# Patient Record
Sex: MALE | URBAN - METROPOLITAN AREA
[De-identification: names, ages, dates, MRNs, and addresses within clinical notes are randomized per-mention and may not be internally consistent; named-entity substitution may affect disease eponyms.]

---

## 2021-11-01 ENCOUNTER — OFFICE VISIT (OUTPATIENT)
Dept: URBAN - METROPOLITAN AREA CLINIC 3 | Facility: CLINIC | Age: 81
End: 2021-11-01
Payer: COMMERCIAL

## 2021-11-01 DIAGNOSIS — H52.13 MYOPIA, BILATERAL: Primary | ICD-10-CM

## 2021-11-01 DIAGNOSIS — H02.834 DERMATOCHALASIS OF LEFT UPPER EYELID: ICD-10-CM

## 2021-11-01 DIAGNOSIS — H02.831 DERMATOCHALASIS OF RIGHT UPPER EYELID: ICD-10-CM

## 2021-11-01 PROCEDURE — 92012 INTRM OPH EXAM EST PATIENT: CPT | Performed by: OPHTHALMOLOGY

## 2021-11-01 RX ORDER — ROSUVASTATIN CALCIUM 10 MG/1
10 MG TABLET, FILM COATED ORAL AS DIRECTED
Refills: 0 | Status: ACTIVE
Start: 2021-11-01

## 2021-11-01 RX ORDER — ASPIRIN 81 MG/1
81 MG TABLET, COATED ORAL
Qty: 0 | Refills: 0 | Status: ACTIVE
Start: 2021-11-01

## 2021-11-01 RX ORDER — FLUTICASONE PROPIONATE 50 UG/1
SPRAY, METERED NASAL
Refills: 0 | Status: ACTIVE
Start: 2021-11-01

## 2021-11-01 RX ORDER — LOSARTAN POTASSIUM 25 MG/1
25 MG TABLET ORAL
Refills: 0 | Status: ACTIVE
Start: 2021-11-01

## 2021-11-01 RX ORDER — GLIPIZIDE 5 MG/1
5 MG TABLET ORAL
Refills: 0 | Status: ACTIVE
Start: 2021-11-01

## 2021-11-01 RX ORDER — DONEPEZIL HYDROCHLORIDE 5 MG/1
5 MG TABLET, FILM COATED ORAL AS DIRECTED
Refills: 0 | Status: ACTIVE
Start: 2021-11-01

## 2021-11-01 RX ORDER — SERTRALINE HYDROCHLORIDE 100 MG/1
100 MG TABLET, FILM COATED ORAL AS DIRECTED
Refills: 0 | Status: ACTIVE
Start: 2021-11-01

## 2021-11-01 RX ORDER — OMEPRAZOLE 20 MG/1
20 MG CAPSULE, DELAYED RELEASE ORAL AS DIRECTED
Refills: 0 | Status: ACTIVE
Start: 2021-11-01

## 2021-11-01 RX ORDER — BACLOFEN 20 MG/1
20 MG TABLET ORAL AS DIRECTED
Refills: 0 | Status: ACTIVE
Start: 2021-11-01

## 2021-11-01 RX ORDER — GABAPENTIN 600 MG/1
600 MG TABLET, FILM COATED ORAL AS DIRECTED
Refills: 0 | Status: ACTIVE
Start: 2021-11-01

## 2021-11-01 RX ORDER — MONTELUKAST 10 MG/1
10 MG TABLET, FILM COATED ORAL AS DIRECTED
Refills: 0 | Status: ACTIVE
Start: 2021-11-01

## 2021-11-01 ASSESSMENT — VISUAL ACUITY
OS: 20/25
OD: 20/25

## 2021-11-01 ASSESSMENT — KERATOMETRY
OD: 43.00
OS: 43.25

## 2021-11-01 NOTE — IMPRESSION/PLAN
Impression: Myopia, bilateral: H52.13. Plan: S/P CE OU with Monovision. Rx given for Progressive lenses to use prn.